# Patient Record
Sex: FEMALE | Race: BLACK OR AFRICAN AMERICAN | NOT HISPANIC OR LATINO | URBAN - METROPOLITAN AREA
[De-identification: names, ages, dates, MRNs, and addresses within clinical notes are randomized per-mention and may not be internally consistent; named-entity substitution may affect disease eponyms.]

---

## 2023-04-30 ENCOUNTER — EMERGENCY (EMERGENCY)
Facility: HOSPITAL | Age: 73
LOS: 0 days | Discharge: HOME | End: 2023-05-01
Attending: STUDENT IN AN ORGANIZED HEALTH CARE EDUCATION/TRAINING PROGRAM
Payer: MEDICARE

## 2023-04-30 VITALS
TEMPERATURE: 99 F | DIASTOLIC BLOOD PRESSURE: 89 MMHG | OXYGEN SATURATION: 95 % | WEIGHT: 190.04 LBS | HEIGHT: 63 IN | HEART RATE: 77 BPM | SYSTOLIC BLOOD PRESSURE: 191 MMHG | RESPIRATION RATE: 16 BRPM

## 2023-04-30 DIAGNOSIS — Z82.49 FAMILY HISTORY OF ISCHEMIC HEART DISEASE AND OTHER DISEASES OF THE CIRCULATORY SYSTEM: ICD-10-CM

## 2023-04-30 DIAGNOSIS — S83.92XA SPRAIN OF UNSPECIFIED SITE OF LEFT KNEE, INITIAL ENCOUNTER: ICD-10-CM
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
## 2023-04-30 DIAGNOSIS — Z88.0 ALLERGY STATUS TO PENICILLIN: ICD-10-CM

## 2023-04-30 DIAGNOSIS — M25.562 PAIN IN LEFT KNEE: ICD-10-CM

## 2023-04-30 DIAGNOSIS — Z98.891 HISTORY OF UTERINE SCAR FROM PREVIOUS SURGERY: ICD-10-CM

## 2023-04-30 DIAGNOSIS — I10 ESSENTIAL (PRIMARY) HYPERTENSION: ICD-10-CM
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
## 2023-04-30 PROCEDURE — 73562 X-RAY EXAM OF KNEE 3: CPT | Mod: 26,LT

## 2023-04-30 PROCEDURE — 99285 EMERGENCY DEPT VISIT HI MDM: CPT

## 2023-04-30 PROCEDURE — 73552 X-RAY EXAM OF FEMUR 2/>: CPT | Mod: 26,LT

## 2023-04-30 PROCEDURE — 73700 CT LOWER EXTREMITY W/O DYE: CPT | Mod: 26,LT,MA

## 2023-04-30 RX ORDER — KETOROLAC TROMETHAMINE 30 MG/ML
15 SYRINGE (ML) INJECTION ONCE
Refills: 0 | Status: DISCONTINUED | OUTPATIENT
Start: 2023-04-30 | End: 2023-04-30

## 2023-04-30 RX ORDER — LIDOCAINE 4 G/100G
1 CREAM TOPICAL ONCE
Refills: 0 | Status: COMPLETED | OUTPATIENT
Start: 2023-04-30 | End: 2023-04-30

## 2023-04-30 RX ADMIN — Medication 15 MILLIGRAM(S): at 21:07

## 2023-04-30 RX ADMIN — Medication 15 MILLIGRAM(S): at 21:38

## 2023-04-30 RX ADMIN — LIDOCAINE 1 PATCH: 4 CREAM TOPICAL at 20:50
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
## 2023-04-30 NOTE — ED ADULT NURSE REASSESSMENT NOTE - NS ED NURSE REASSESS COMMENT FT1
patient assisted onto bedpan. patient states pain has no changed but does not wish to try any other pain medications at this time. NAD noted. MD Pappas aware.
patient states pain has improved and does not require any more medication at this time. NAD noted.

## 2023-05-01 VITALS
SYSTOLIC BLOOD PRESSURE: 157 MMHG | RESPIRATION RATE: 17 BRPM | HEART RATE: 80 BPM | OXYGEN SATURATION: 98 % | DIASTOLIC BLOOD PRESSURE: 70 MMHG

## 2023-05-01 RX ORDER — OXYCODONE AND ACETAMINOPHEN 5; 325 MG/1; MG/1
1 TABLET ORAL
Qty: 10 | Refills: 0
Start: 2023-05-01 | End: 2023-05-05

## 2023-05-01 RX ORDER — OXYCODONE AND ACETAMINOPHEN 5; 325 MG/1; MG/1
1 TABLET ORAL
Qty: 7 | Refills: 0
Start: 2023-05-01 | End: 2023-05-07
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
## 2023-05-01 NOTE — ED PROVIDER NOTE - CLINICAL SUMMARY MEDICAL DECISION MAKING FREE TEXT BOX
Likely ligamentous versus tendon injury, most likely ACL.  Will rule out fracture versus dislocation with x-ray    Update "significant swelling seen on x-ray, CT ordered which showed stranding around quadricep tendon, patient was able to use extensors on exam and no clear patellar deficit, low sufficient for quadricep tendon rupture, Ortho paged and believes likely ACL versus other ligamentous injury.  Recommends knee immobilizer Ace wrap pain control weightbearing as tolerated and follow-up with Dr. Holland in 1 week

## 2023-05-01 NOTE — ED PROVIDER NOTE - CARE PROVIDER_API CALL
Naeem Holland (DO)  Orthopaedic Surgery  [address] [place] [address]  Phone: [phone]  Fax: [phone]  Established Patient  Follow Up Time: Urgent

## 2023-05-01 NOTE — ED PROVIDER NOTE - PHYSICAL EXAMINATION
General: No acute distress, mentation at baseline,  well nourished, well developed  HEENT: NCAT, Neck supple without meningismus, PERRL, no conjunctival injection  Lungs: CTAB, No wheeze or crackles, No retractions, No increased work of breathing  Heart: S1S2 RRR, No M/R/G, Pules equal Bilaterally in upper and lower extremities distally  Abd: soft, NT/ND, No guarding, No rebound.  No hernias, no palpable masses.  Extrem: L knee: Patella nontender, Medial and Lateral Joint lines both tender, Posterior drawer and Lachman’s exam without significant laxity, Varus and Valgus Stress without significant laxity, flexion limited by pain, able to extend against gravity, Neurovascular exam distally intact, Compartments surrounding are soft  Skin: No rash noted, warm dry.  Neuro:  Grossly normal.  No difficulty ambulating. No focal deficits.  Psychiatric: Appropriate mood and affect.

## 2023-05-01 NOTE — ED PROVIDER NOTE - PATIENT PORTAL LINK FT
You can access the FollowMyHealth Patient Portal offered by [facility] by registering at the following website: [email or web address]. By joining [employer]’s FollowMyHealth portal, you will also be able to view your health information using other applications (apps) compatible with our system.

## 2023-05-01 NOTE — ED PROVIDER NOTE - NS ED ROS FT
CONST: no fevers, no chills  EYES: no pain, no vision changes  ENT: no sore throat, no ear pain, no change in hearing  CV: no chest pain, no leg swelling  RESP: no shortness of breath, no cough  ABD: no abdominal pain, no nausea, no vomiting, no diarrhea  : no dysuria, no flank pain, no hematuria  MSK: no back pain, (+) extremity pain  NEURO: no headache or additional neurologic complaints  HEME: no easy bleeding  SKIN:  no rash

## 2023-05-01 NOTE — ED PROVIDER NOTE - NSICDXFAMILYHX_GEN_ALL_CORE_FT
FAMILY HISTORY:  Family history of thyroid disease    Sibling  Still living? Unknown  Family history of hypertension, Age at diagnosis: Age Unknown    Grandparent  Still living? Unknown  Family history of hypertension, Age at diagnosis: Age Unknown

## 2024-01-31 NOTE — ED ADULT NURSE NOTE - NSIMPLEMENTINTERV_GEN_ALL_ED
Home
Implemented All Fall Risk Interventions:  [place] to call system. Call bell, personal items and telephone within reach. Instruct patient to call for assistance. Room bathroom lighting operational. Non-slip footwear when patient is off stretcher. Physically safe environment: no spills, clutter or unnecessary equipment. Stretcher in lowest position, wheels locked, appropriate side rails in place. Provide visual cue, wrist band, yellow gown, etc. Monitor gait and stability. Monitor for mental status changes and reorient to person, place, and time. Review medications for side effects contributing to fall risk. Reinforce activity limits and safety measures with patient and family.

## 2025-07-05 ENCOUNTER — EMERGENCY (EMERGENCY)
Facility: HOSPITAL | Age: 75
LOS: 1 days | End: 2025-07-05
Attending: STUDENT IN AN ORGANIZED HEALTH CARE EDUCATION/TRAINING PROGRAM | Admitting: EMERGENCY MEDICINE
Payer: MEDICARE

## 2025-07-05 VITALS
TEMPERATURE: 98 F | DIASTOLIC BLOOD PRESSURE: 96 MMHG | RESPIRATION RATE: 17 BRPM | OXYGEN SATURATION: 96 % | SYSTOLIC BLOOD PRESSURE: 182 MMHG | HEART RATE: 72 BPM

## 2025-07-05 VITALS
SYSTOLIC BLOOD PRESSURE: 130 MMHG | RESPIRATION RATE: 18 BRPM | DIASTOLIC BLOOD PRESSURE: 56 MMHG | OXYGEN SATURATION: 98 % | HEART RATE: 62 BPM

## 2025-07-05 DIAGNOSIS — Z98.890 OTHER SPECIFIED POSTPROCEDURAL STATES: Chronic | ICD-10-CM

## 2025-07-05 LAB
ADD ON TEST-SPECIMEN IN LAB: SIGNIFICANT CHANGE UP
ALBUMIN SERPL ELPH-MCNC: 3.9 G/DL — SIGNIFICANT CHANGE UP (ref 3.3–5)
ALP SERPL-CCNC: 81 U/L — SIGNIFICANT CHANGE UP (ref 40–120)
ALT FLD-CCNC: 16 U/L — SIGNIFICANT CHANGE UP (ref 4–33)
ANION GAP SERPL CALC-SCNC: 12 MMOL/L — SIGNIFICANT CHANGE UP (ref 7–14)
APPEARANCE UR: CLEAR — SIGNIFICANT CHANGE UP
APTT BLD: 31.5 SEC — SIGNIFICANT CHANGE UP (ref 26.1–36.8)
AST SERPL-CCNC: 47 U/L — HIGH (ref 4–32)
BASOPHILS # BLD AUTO: 0.02 K/UL — SIGNIFICANT CHANGE UP (ref 0–0.2)
BASOPHILS NFR BLD AUTO: 0.3 % — SIGNIFICANT CHANGE UP (ref 0–2)
BILIRUB SERPL-MCNC: 0.4 MG/DL — SIGNIFICANT CHANGE UP (ref 0.2–1.2)
BILIRUB UR-MCNC: NEGATIVE — SIGNIFICANT CHANGE UP
BUN SERPL-MCNC: 21 MG/DL — SIGNIFICANT CHANGE UP (ref 7–23)
CALCIUM SERPL-MCNC: 8.6 MG/DL — SIGNIFICANT CHANGE UP (ref 8.4–10.5)
CHLORIDE SERPL-SCNC: 105 MMOL/L — SIGNIFICANT CHANGE UP (ref 98–107)
CO2 SERPL-SCNC: 21 MMOL/L — LOW (ref 22–31)
COLOR SPEC: YELLOW — SIGNIFICANT CHANGE UP
CREAT SERPL-MCNC: 1.43 MG/DL — HIGH (ref 0.5–1.3)
DIFF PNL FLD: NEGATIVE — SIGNIFICANT CHANGE UP
EGFR: 39 ML/MIN/1.73M2 — LOW
EGFR: 39 ML/MIN/1.73M2 — LOW
EOSINOPHIL # BLD AUTO: 0.32 K/UL — SIGNIFICANT CHANGE UP (ref 0–0.5)
EOSINOPHIL NFR BLD AUTO: 4.4 % — SIGNIFICANT CHANGE UP (ref 0–6)
FLUAV AG NPH QL: SIGNIFICANT CHANGE UP
FLUBV AG NPH QL: SIGNIFICANT CHANGE UP
GLUCOSE SERPL-MCNC: 77 MG/DL — SIGNIFICANT CHANGE UP (ref 70–99)
GLUCOSE UR QL: NEGATIVE MG/DL — SIGNIFICANT CHANGE UP
HCT VFR BLD CALC: 37.9 % — SIGNIFICANT CHANGE UP (ref 34.5–45)
HGB BLD-MCNC: 11.5 G/DL — SIGNIFICANT CHANGE UP (ref 11.5–15.5)
IMM GRANULOCYTES # BLD AUTO: 0.02 K/UL — SIGNIFICANT CHANGE UP (ref 0–0.07)
IMM GRANULOCYTES NFR BLD AUTO: 0.3 % — SIGNIFICANT CHANGE UP (ref 0–0.9)
INR BLD: 0.92 RATIO — SIGNIFICANT CHANGE UP (ref 0.85–1.16)
KETONES UR QL: NEGATIVE MG/DL — SIGNIFICANT CHANGE UP
LACTATE SERPL-SCNC: 0.9 MMOL/L — SIGNIFICANT CHANGE UP (ref 0.5–2)
LEUKOCYTE ESTERASE UR-ACNC: NEGATIVE — SIGNIFICANT CHANGE UP
LYMPHOCYTES # BLD AUTO: 1.27 K/UL — SIGNIFICANT CHANGE UP (ref 1–3.3)
LYMPHOCYTES NFR BLD AUTO: 17.3 % — SIGNIFICANT CHANGE UP (ref 13–44)
MCHC RBC-ENTMCNC: 26.8 PG — LOW (ref 27–34)
MCHC RBC-ENTMCNC: 30.3 G/DL — LOW (ref 32–36)
MCV RBC AUTO: 88.3 FL — SIGNIFICANT CHANGE UP (ref 80–100)
MONOCYTES # BLD AUTO: 0.68 K/UL — SIGNIFICANT CHANGE UP (ref 0–0.9)
MONOCYTES NFR BLD AUTO: 9.3 % — SIGNIFICANT CHANGE UP (ref 2–14)
NEUTROPHILS # BLD AUTO: 5.02 K/UL — SIGNIFICANT CHANGE UP (ref 1.8–7.4)
NEUTROPHILS NFR BLD AUTO: 68.4 % — SIGNIFICANT CHANGE UP (ref 43–77)
NITRITE UR-MCNC: NEGATIVE — SIGNIFICANT CHANGE UP
NRBC # BLD AUTO: 0 K/UL — SIGNIFICANT CHANGE UP (ref 0–0)
NRBC # FLD: 0 K/UL — SIGNIFICANT CHANGE UP (ref 0–0)
NRBC BLD AUTO-RTO: 0 /100 WBCS — SIGNIFICANT CHANGE UP (ref 0–0)
PH UR: 6.5 — SIGNIFICANT CHANGE UP (ref 5–8)
PLATELET # BLD AUTO: 211 K/UL — SIGNIFICANT CHANGE UP (ref 150–400)
PMV BLD: 10.8 FL — SIGNIFICANT CHANGE UP (ref 7–13)
POTASSIUM SERPL-MCNC: SIGNIFICANT CHANGE UP MMOL/L (ref 3.5–5.3)
POTASSIUM SERPL-SCNC: SIGNIFICANT CHANGE UP MMOL/L (ref 3.5–5.3)
PROT SERPL-MCNC: 7.9 G/DL — SIGNIFICANT CHANGE UP (ref 6–8.3)
PROT UR-MCNC: SIGNIFICANT CHANGE UP MG/DL
PROTHROM AB SERPL-ACNC: 11 SEC — SIGNIFICANT CHANGE UP (ref 9.9–13.4)
RBC # BLD: 4.29 M/UL — SIGNIFICANT CHANGE UP (ref 3.8–5.2)
RBC # FLD: 14.2 % — SIGNIFICANT CHANGE UP (ref 10.3–14.5)
RSV RNA NPH QL NAA+NON-PROBE: SIGNIFICANT CHANGE UP
SARS-COV-2 RNA SPEC QL NAA+PROBE: SIGNIFICANT CHANGE UP
SODIUM SERPL-SCNC: 138 MMOL/L — SIGNIFICANT CHANGE UP (ref 135–145)
SOURCE RESPIRATORY: SIGNIFICANT CHANGE UP
SP GR SPEC: 1.02 — SIGNIFICANT CHANGE UP (ref 1–1.03)
UROBILINOGEN FLD QL: 0.2 MG/DL — SIGNIFICANT CHANGE UP (ref 0.2–1)
WBC # BLD: 7.33 K/UL — SIGNIFICANT CHANGE UP (ref 3.8–10.5)
WBC # FLD AUTO: 7.33 K/UL — SIGNIFICANT CHANGE UP (ref 3.8–10.5)

## 2025-07-05 PROCEDURE — 99223 1ST HOSP IP/OBS HIGH 75: CPT

## 2025-07-05 PROCEDURE — 93971 EXTREMITY STUDY: CPT | Mod: 26,LT

## 2025-07-05 PROCEDURE — 73590 X-RAY EXAM OF LOWER LEG: CPT | Mod: 26,LT

## 2025-07-05 PROCEDURE — 99284 EMERGENCY DEPT VISIT MOD MDM: CPT | Mod: GC

## 2025-07-05 RX ORDER — CLINDAMYCIN PHOSPHATE 150 MG/ML
VIAL (ML) INJECTION
Refills: 0 | Status: DISCONTINUED | OUTPATIENT
Start: 2025-07-05 | End: 2025-07-05

## 2025-07-05 RX ORDER — CLINDAMYCIN PHOSPHATE 150 MG/ML
900 VIAL (ML) INJECTION ONCE
Refills: 0 | Status: COMPLETED | OUTPATIENT
Start: 2025-07-05 | End: 2025-07-05

## 2025-07-05 RX ORDER — CLOTRIMAZOLE AND BETAMETHASONE DIPROPIONATE 10; .64 MG/G; MG/G
1 CREAM TOPICAL
Refills: 0 | Status: DISCONTINUED | OUTPATIENT
Start: 2025-07-05 | End: 2025-07-05

## 2025-07-05 RX ORDER — CARVEDILOL 3.12 MG/1
6.25 TABLET, FILM COATED ORAL EVERY 12 HOURS
Refills: 0 | Status: DISCONTINUED | OUTPATIENT
Start: 2025-07-05 | End: 2025-07-08

## 2025-07-05 RX ORDER — KETOROLAC TROMETHAMINE 30 MG/ML
15 INJECTION, SOLUTION INTRAMUSCULAR; INTRAVENOUS EVERY 6 HOURS
Refills: 0 | Status: DISCONTINUED | OUTPATIENT
Start: 2025-07-05 | End: 2025-07-05

## 2025-07-05 RX ORDER — CLINDAMYCIN PHOSPHATE 150 MG/ML
600 VIAL (ML) INJECTION ONCE
Refills: 0 | Status: DISCONTINUED | OUTPATIENT
Start: 2025-07-05 | End: 2025-07-05

## 2025-07-05 RX ORDER — AMLODIPINE BESYLATE 10 MG/1
2.5 TABLET ORAL DAILY
Refills: 0 | Status: DISCONTINUED | OUTPATIENT
Start: 2025-07-05 | End: 2025-07-08

## 2025-07-05 RX ORDER — MUPIROCIN CALCIUM 20 MG/G
1 CREAM TOPICAL
Refills: 0 | Status: DISCONTINUED | OUTPATIENT
Start: 2025-07-05 | End: 2025-07-05

## 2025-07-05 RX ORDER — CLINDAMYCIN PHOSPHATE 150 MG/ML
900 VIAL (ML) INJECTION EVERY 8 HOURS
Refills: 0 | Status: DISCONTINUED | OUTPATIENT
Start: 2025-07-05 | End: 2025-07-05

## 2025-07-05 RX ADMIN — CARVEDILOL 6.25 MILLIGRAM(S): 3.12 TABLET, FILM COATED ORAL at 16:20

## 2025-07-05 RX ADMIN — AMLODIPINE BESYLATE 2.5 MILLIGRAM(S): 10 TABLET ORAL at 16:20

## 2025-07-05 RX ADMIN — Medication 100 MILLIGRAM(S): at 15:19

## 2025-07-05 RX ADMIN — Medication 1 APPLICATION(S): at 17:48

## 2025-07-05 RX ADMIN — Medication 10 MILLIGRAM(S): at 16:20

## 2025-07-05 NOTE — ED PROVIDER NOTE - NSICDXPASTSURGICALHX_GEN_ALL_CORE_FT
PAST SURGICAL HISTORY:  H/O vascular surgery left leg 1980s as per pt    S/P  section x3    S/P hernia repair

## 2025-07-05 NOTE — CONSULT NOTE ADULT - ASSESSMENT
#Eczematous dermatitis, LLE, torso and extremities  - Discussed natural course with patient  - No evidence of cellulitis on exam today, can discontinue oral antibiotics   - START clobetasol ointment to affected areas BID for up to 2 weeks on 1 week, avoid face    Patient can follow up with us in the [facility] located at [address] [place] [address]. [address], [place] [address] upon discharge. Our office will call to schedule an appointment but if patient does not hear from us within a few days of discharge, please instruct patient to call our office. Office phone number is [phone].    Patient was seen at bedside and staffed in person with the dermatology attending Dr. iLu  Recommendations were communicated with the primary team.  Please page [phone] for further related questions.    Geovany Yoder MD  Resident Physician, PGY2  [facility]  Pager: [phone]  Office: [phone]

## 2025-07-05 NOTE — ED PROVIDER NOTE - OBJECTIVE STATEMENT
75 y/o F PMHx HTN presents with persistent cellulitis for 2 months. Patient says she hit her left leg on May 1st and developed a bruise on her leg after which has worsened since then. She says she has completed a 7 day course of Bactrim last week which did not resolve symptoms. Pt says her PCP prescribed another 7 day course of Bactrim which she is on day 3 of currently. Patient says she has also been experiencing bruises on her body. Denies fever, chills, HA, vision changes, n/v/d 75 y/o F PMHx HTN presents with persistent cellulitis for 2 months. Patient says she hit her left leg on May 1st and developed a bruise on her leg after which has worsened since then. She says she has completed a 7 day course of Bactrim last week which did not resolve symptoms. Pt says her PCP prescribed another 7 day course of Bactrim which she is on day 3 of currently. Patient says she has also been experiencing itchy rashes over her body since starting the Bactrim. Denies fever, chills, HA, vision changes, n/v/d

## 2025-07-05 NOTE — ED CDU PROVIDER DISPOSITION NOTE - CLINICAL COURSE
75 y/o F PMHx HTN presents with persistent LE redness, pain and swelling for 2 months. Patient says she hit her left leg on May 1st and developed a bruise on her leg after which has worsened since then. She says she has completed a 7 day course of Bactrim last week which did not resolve symptoms. Pt says her PCP prescribed another 7 day course of Bactrim which she is on day 3 of currently. Patient says she has also been experiencing itchy rashes over her body since starting the Bactrim. Pt endorses a remote h/o sarcoidosis and a remote h/o a "vein surgery" on her LLE in the 1980s. Pt denies fever chills or nightsweats, CP, SOB, denies any other sx or acute complaints. The area of concern was demarcated, of note pt states she has been putting dressings to the area with tape and states the redness has primarily been around the tape. Dermatology was consulted by the ED team. Considerations include possible LE cellulitis, possible contact dermatitis possible venous insufficiency. A LE duplex was ordered to eval for a DVT. Will provide IV clindamycin in the interim, reassess. DVT study was neg, pt was seen by derm, feels the etiology is a contact dermatitis and nummular eczema. State abx no longer needed, writer agrees, systemically well, no lactate or leukocytosis. Will plan to d/c on clobetasol as advised with outpatient derm f/u. Addressed pt's elavated BP with her, home meds given, pt states her bp is often high in healthcare settings, denies HA visual change or other sx, will d/c with dermatology follow up and return precautions.

## 2025-07-05 NOTE — ED ADULT TRIAGE NOTE - CHIEF COMPLAINT QUOTE
Pt c/o persistent cellulitis to left lower leg, Pt states she finished 2 courses of oral antibiotics with no improvement. Past hx htn, Pt hypertensive in triage, asymptomatic

## 2025-07-05 NOTE — ED PROVIDER NOTE - PROGRESS NOTE DETAILS
Isabella PGY1: Spoke to CDU to admit patient for IV antibiotic treatment with Vancomycin. CDU recommended admission for Vanc Isabella PGY1: Spoke to CDU will admit patient for IV Clindamycin treatment and will consult Derm for rash

## 2025-07-05 NOTE — ED CDU PROVIDER INITIAL DAY NOTE - PROGRESS NOTE DETAILS
Pt was evaluated by Dermatology while in CDU including by attending Dr. Guzman, do not feel the pt required antibitoics, suspicious for a contact dermatitis and nummular eczema, will d/c with outpatient follow up, return precautions. Dermatology recommends clobetasol cream.

## 2025-07-05 NOTE — ED PROVIDER NOTE - CARE PLAN
Assessment and plan of treatment:	Cellulitis failed 2 courses antibiotics  Sepsis workup (labs, xray)   1 Principal Discharge DX:	Rash  Assessment and plan of treatment:	Cellulitis failed 2 courses antibiotics  Sepsis workup (labs, xray)

## 2025-07-05 NOTE — ED CDU PROVIDER INITIAL DAY NOTE - CLINICAL SUMMARY MEDICAL DECISION MAKING FREE TEXT BOX
73 y/o F PMHx HTN presents with persistent LE redness, pain and swelling for 2 months. Patient says she hit her left leg on May 1st and developed a bruise on her leg after which has worsened since then. She says she has completed a 7 day course of Bactrim last week which did not resolve symptoms. Pt says her PCP prescribed another 7 day course of Bactrim which she is on day 3 of currently. Patient says she has also been experiencing itchy rashes over her body since starting the Bactrim. Pt endorses a remote h/o sarcoidosis and a remote h/o a "vein surgery" on her LLE in the 1980s. Pt denies fever chills or nightsweats, CP, SOB, denies any other sx or acute complaints. The area of concern was demarcated, of note pt states she has been putting dressings to the area with tape and states the redness has primarily been around the tape. Dermatology was consulted by the ED team. Considerations include possible LE cellulitis, possible contact dermatitis possible venous insufficiency. A LE duplex was ordered to eval for a DVT. Will provide IV clindamycin in the interim, reassess.

## 2025-07-05 NOTE — ED ADULT NURSE NOTE - TEMPLATE LIST FOR HEAD TO TOE ASSESSMENT
General
Initiate Treatment: Apply ketoconazole 2% shampoo to scalp
Detail Level: Zone
Render In Strict Bullet Format?: No

## 2025-07-05 NOTE — ED CDU PROVIDER DISPOSITION NOTE - ATTENDING APP SHARED VISIT CONTRIBUTION OF CARE
kanchan: pt placed in cdu for derm eval.  derm saw pt, came up with plan, pt agrees and understands, lucid and alert.    I performed a history and physical exam of the patient and discussed their management with the resident and /or advanced care provider. I personally made/approved the management plan and take responsibility for the patient management. I reviewed the resident and /or ACP's note and agree with the documented findings and plan of care. My medical decison making and observations are found above. kanchan: pt placed in cdu for derm eval.  derm saw pt, came up with plan, pt agrees and understands, lucid and alert.    I performed a history and physical exam of the patient and discussed their management with the resident and /or advanced care provider. I personally made/approved the management plan and take responsibility for the patient management. I reviewed the resident and /or ACP's note and agree with the documented findings and plan of care. My medical decision making and observations are found above.. kanchan: pt placed in cdu for derm eval.  derm saw pt, came up with plan, pt agrees and understands, pt with rash. lucid and alert and ambulatory.      I performed a history and physical exam of the patient and discussed their management with the resident and /or advanced care provider. I personally made/approved the management plan and take responsibility for the patient management. I reviewed the resident and /or ACP's note and agree with the documented findings and plan of care. My medical decision making and observations are found above..

## 2025-07-05 NOTE — ED CDU PROVIDER INITIAL DAY NOTE - OBJECTIVE STATEMENT
75 y/o F PMHx HTN presents with persistent LE redness, pain and swelling for 2 months. Patient says she hit her left leg on May 1st and developed a bruise on her leg after which has worsened since then. She says she has completed a 7 day course of Bactrim last week which did not resolve symptoms. Pt says her PCP prescribed another 7 day course of Bactrim which she is on day 3 of currently. Patient says she has also been experiencing itchy rashes over her body since starting the Bactrim. Pt endorses a remote h/o sarcoidosis and a remote h/o a "vein surgery" on her LLE in the 1980s. Pt denies fever chills or nightsweats, CP, SOB, denies any other sx or acute complaints. The area of concern was demarcated, of note pt states she has been putting dressings to the area with tape and states the redness has primarily been around the tape.

## 2025-07-05 NOTE — ED PROVIDER NOTE - CLINICAL SUMMARY MEDICAL DECISION MAKING FREE TEXT BOX
Isabella PGY1: 75 y/o F PMHx HTN, HLD presents with worsening cellulitis to left anterior shin for 2 months. Patient has completed 7 day course of Bactrim in May and is currently on second day of 7 day course of Bactrim with worsening of cellulitis and diffuse scaly rash that began after beginning Bactrim. Physical exam notable for tender, scaly, erythematous rash to left anterior shin and diffuse 1 inch scaly pruritic rashes.   Pending labs and imaging, considering admission for IV antibiotic treatment.

## 2025-07-05 NOTE — ED CDU PROVIDER INITIAL DAY NOTE - ATTENDING APP SHARED VISIT CONTRIBUTION OF CARE
74yoF PMHx HTN presenting with worsening swelling and redness to her left shin. She wounded her shin 2 weeks ago, banging it into a stool, finished a 7 day course of bactrim and is now on day 3 of second course of bactrim, returning due to increasing area of redness, swelling around the wound. She also noticed spots of itchy dark rash appearing all over her arms and legs for the past week. No involvement of mucosal membranes including no vision changes, no dark or bloody stool, no swelling or pain in her gums or mouth. No mucosal lesions, or lesions to palms or soles noted on exam. Pt admitted to CDU for derm consult and IV abx for cellulitis.

## 2025-07-05 NOTE — ED CDU PROVIDER DISPOSITION NOTE - NSFOLLOWUPINSTRUCTIONS_ED_ALL_ED_FT
Take clobetasol as prescribed. Antibiotics are not currently needed as the cause is nummular eczema and a contact dermatitis. Do not apply clobetasol to the face. Follow up with a Dermatologist, the ER will call you to assist with follow up or you can call  located at [address] [facility] [address]. Your blood pressure was elevated in the ER, discuss this with your primary doctor. Advance activity as tolerated.  Continue all previously prescribed medications as directed.  Follow up with your primary care physician in 48-72 hours- bring copies of your results.  Return to the ER for worsening or persistent symptoms, and/or ANY NEW OR CONCERNING SYMPTOMS. THIS INCLUDES BUT IS NOT LIMITED TO FEVER, CHILLS, NIGHTSWEATS, INCREASING REDNESS OR FOR ANY OTHER SYMPTOMS THAT CONCERN YOU. If you have issues obtaining follow up, please call: [phone] to obtain a doctor or specialist who takes your insurance in your area.  You may call [phone] to make an appointment with the internal medicine clinic.

## 2025-07-05 NOTE — CONSULT NOTE ADULT - ATTENDING COMMENTS
Rash clinically consistent with an eczematous dermatitis. The lesion on the leg may have a component of LSC. Chronic venous stasis dermatitis and ACD are both considerations. Recommend initiation of clobetasol. Low suspicion for infection based on the clinical exam.     Joel cAosta MD, PharmD, MPH  Co-Director, Inpatient Dermatology Consultation Service, [facility]
Quality 265: Biopsy Follow-Up: Biopsy results reviewed, communicated, tracked, and documented
Detail Level: Detailed

## 2025-07-05 NOTE — ED PROVIDER NOTE - ATTENDING CONTRIBUTION TO CARE
I personally evaluated the patient. I reviewed the Resident’s or Physician Assistant’s note (as assigned above), and agree with the findings and plan except as documented in my note.    74yoF PMHx HTN presenting with worsening swelling and redness to her left shin. She wounded her shin 2 weeks ago, banging it into a stool, finished a 7 day course of bactrim and is now on day 3 of second course of bactrim, returning due to increasing area of redness, swelling around the wound. She also noticed spots of itchy dark rash appearing all over her arms and legs for the past week. No involvement of mucosal membranes including no vision changes, no dark or bloody stool, no swelling or pain in her gums or mouth. No mucosal lesions, or lesions to palms or soles noted on exam. Pt admitted to CDU for derm consult and IV abx for cellulitis.

## 2025-07-05 NOTE — ED CDU PROVIDER DISPOSITION NOTE - PATIENT PORTAL LINK FT
You can access the FollowMyHealth Patient Portal offered by [facility] by registering at the following website: [email or web address]. By joining [employer]’s FollowMyHealth portal, you will also be able to view your health information using other applications (apps) compatible with our system.

## 2025-07-05 NOTE — ED PROVIDER NOTE - PHYSICAL EXAMINATION
Gen: AAOx3, non-toxic  HEENT: NCAT. PEERLA, EOMI, oral mucosa moist, normal conjunctiva  Lung: CTAB, no respiratory distress, no wheezes/rhonchi/rales B/L, speaking in full sentences  CV: RRR, no murmurs, rubs or gallops  Abd: soft, NTND, no guarding, no CVA tenderness  MSK: no visible deformities  Neuro: No focal sensory or motor deficits  Skin: Warm, scaly, erythematous poorly demarcated area to left anterior shin, warm, well perfused  Psych: normal affect. Gen: AAOx3, non-toxic  HEENT: NCAT. PEERLA, EOMI, oral mucosa moist, normal conjunctiva  Lung: CTAB, no respiratory distress, no wheezes/rhonchi/rales B/L, speaking in full sentences  CV: RRR, no murmurs, rubs or gallops  Abd: soft, NTND, no guarding, no CVA tenderness  MSK: no visible deformities  Neuro: No focal sensory or motor deficits  Skin: Warm, scaly, erythematous poorly demarcated area to left anterior shin, warm, well perfused. Multiple scaly pruritic rashes noted on back, chest, arm  Psych: normal affect.

## 2025-07-05 NOTE — ED ADULT NURSE NOTE - OBJECTIVE STATEMENT
A&Ox4, ambulatory with steady gait, history of hypertension, presents to ED complaining of persistent cellulitis to L lower leg. Pt states she finished 2 courses of oral antibiotics without seeing improvement. Respirations are even and unlabored, sating 100% on room air. Awaiting orders.

## 2025-07-05 NOTE — ED CDU PROVIDER INITIAL DAY NOTE - PHYSICAL EXAMINATION
Gen: AAOx3, non-toxic  HEENT: NCAT. PEERLA, EOMI, oral mucosa moist, normal conjunctiva  Lung: CTAB, no respiratory distress, no wheezes/rhonchi/rales B/L, speaking in full sentences  CV: RRR, no murmurs, rubs or gallops  Abd: soft, NTND, no guarding, no CVA tenderness  MSK: no visible deformities  Neuro: No focal sensory or motor deficits  Skin: Warm, scaly, erythematous poorly demarcated area to left anterior shin, warm, cap refill <2 seconds, palpable dorsal pulse. Area of erythema is [id number]. Multiple scaly pruritic rashes noted on back, chest, arm  Psych: normal affect.

## 2025-07-05 NOTE — CONSULT NOTE ADULT - SUBJECTIVE AND OBJECTIVE BOX
HPI: Pt is 74 year old presenting for worsening of LLE rash. Notes that she was diagnosed with cellulitis 2 months ago which has persisted. Has taken Bactrim x 2 times. Notes rash is very itchy, now getting spots on other parts of her body. Initially started as trauma to area. Notes that when she but bandaids and other topicals in the area the rash worsened.       PAST MEDICAL & SURGICAL HISTORY:  HTN (hypertension)      Sarcoidosis    S/P  section  x3      S/P hernia repair      H/O vascular surgery  left leg 1980s as per pt        Review of Systems:  Constitutional: denies fevers, chills  HEENT: denies odynophagia or dysphagia  Cardiovascular: denies palpitations  Respiratory: denies SOB, wheezing  Gastrointestinal: denies N/V/D, abdominal pain  : denies dysuria  MSK: denies weakness, joint pain  Skin: denies new rashes or masses unless otherwise specified in hpi    MEDICATIONS  (STANDING):  amLODIPine   Tablet 2.5 milliGRAM(s) Oral daily  carvedilol 6.25 milliGRAM(s) Oral every 12 hours  cetirizine 10 milliGRAM(s) Oral daily    MEDICATIONS  (PRN):  ketorolac   Injectable 15 milliGRAM(s) IV Push every 6 hours PRN Moderate Pain (4 - 6)      Allergies    penicillin (Rash)    Intolerances        SOCIAL HISTORY: Here with     FAMILY HISTORY:  Family history of hypertension (Sibling, Grandparent)    Family history of thyroid disease        Vital Signs Last 24 Hrs  T(C): 36.4 (2025 16:20), Max: 36.7 (2025 11:13)  T(F): 97.6 (2025 16:20), Max: 98 (2025 11:13)  HR: 62 (2025 17:24) (62 - 72)  BP: 130/56 (2025 17:24) (130/56 - 185/75)  BP(mean): --  RR: 18 (2025 17:24) (17 - 24)  SpO2: 98% (2025 17:24) (96% - 100%)    Parameters below as of 2025 17:24  Patient On (Oxygen Delivery Method): room air      PHYSICAL EXAM:   The patient was alert and oriented X 3 and in no apparent distress. Oropharynx showed no ulcerations. There was no visible lymphadenopathy. Conjunctiva were non-injected. There was no clubbing or edema of extremities.     The scalp, hair, face, eyebrows, lips, oropharynx , neck, chest, back, buttocks, extremities X 4, hands, feet, nails were examined. There was no hyperhidrosis or bromhidrosis.     The following lesions are noted: Lichenified plaque with surrounding erythematous thin plaque on LLE, eczematous patches scattered on torso and extremities.     LABS:                        11.5   7.33  )-----------( 211      ( 2025 12:15 )             37.9     07-    138  |  105  |  21  ----------------------------<  77  TNP   |  21[L]  |  1.43[H]    Ca    8.6      2025 12:15    TPro  7.9  /  Alb  3.9  /  TBili  0.4  /  DBili  x   /  AST  47[H]  /  ALT  16  /  AlkPhos  81  07-05    PT/INR - ( 2025 12:54 )   PT: 11.0 sec;   INR: 0.92 ratio         PTT - ( 2025 12:54 )  PTT:31.5 sec  Urinalysis Basic - ( 2025 16:34 )    Color: Yellow / Appearance: Clear / S.022 / pH: x  Gluc: x / Ketone: x  / Bili: Negative / Urobili: 0.2 mg/dL   Blood: x / Protein: Trace mg/dL / Nitrite: Negative   Leuk Esterase: Negative / RBC: x / WBC x   Sq Epi: x / Non Sq Epi: x / Bacteria: x        RADIOLOGY & ADDITIONAL STUDIES:   HPI: Pt is 74 year old presenting for worsening of LLE rash. Notes that she was diagnosed with cellulitis 2 months ago which has persisted. Has taken Bactrim x 2 times. Notes rash is very itchy, now getting spots on other parts of her body. Initially started as trauma to area. Notes that when she but bandaids and other topicals in the area the rash worsened.       PAST MEDICAL & SURGICAL HISTORY:  HTN (hypertension)      Sarcoidosis    S/P  section  x3      S/P hernia repair      H/O vascular surgery  left leg 1980s as per pt        Review of Systems:  Constitutional: denies fevers, chills  HEENT: denies odynophagia or dysphagia  Cardiovascular: denies palpitations  Respiratory: denies SOB, wheezing  Gastrointestinal: denies N/V/D, abdominal pain  : denies dysuria  MSK: denies weakness, joint pain  Skin: denies new rashes or masses unless otherwise specified in hpi    MEDICATIONS  (STANDING):  amLODIPine   Tablet 2.5 milliGRAM(s) Oral daily  carvedilol 6.25 milliGRAM(s) Oral every 12 hours  cetirizine 10 milliGRAM(s) Oral daily    MEDICATIONS  (PRN):  ketorolac   Injectable 15 milliGRAM(s) IV Push every 6 hours PRN Moderate Pain (4 - 6)      Allergies    penicillin (Rash)    Intolerances        SOCIAL HISTORY: Here with     FAMILY HISTORY:  Family history of hypertension (Sibling, Grandparent)    Family history of thyroid disease        Vital Signs Last 24 Hrs  T(C): 36.4 (2025 16:20), Max: 36.7 (2025 11:13)  T(F): 97.6 (2025 16:20), Max: 98 (2025 11:13)  HR: 62 (2025 17:24) (62 - 72)  BP: 130/56 (2025 17:24) (130/56 - 185/75)  BP(mean): --  RR: 18 (2025 17:24) (17 - 24)  SpO2: 98% (2025 17:24) (96% - 100%)    Parameters below as of 2025 17:24  Patient On (Oxygen Delivery Method): room air      PHYSICAL EXAM:   The patient was alert and oriented X 3 and in no apparent distress. Oropharynx showed no ulcerations. There was no visible lymphadenopathy. Conjunctiva were non-injected. There was no clubbing or edema of extremities.     The scalp, hair, face, eyebrows, lips, oropharynx , neck, chest, back, buttocks, extremities X 4, hands, feet, nails were examined. There was no hyperhidrosis or bromhidrosis.     The following lesions are noted: Lichenified plaque with surrounding erythematous thin plaque on LLE, eczematous patches scattered on torso and extremities.     LABS:                        11.5   7.33  )-----------( 211      ( 2025 12:15 )             37.9     07-    138  |  105  |  21  ----------------------------<  77  TNP   |  21[L]  |  1.43[H]    Ca    8.6      2025 12:15    TPro  7.9  /  Alb  3.9  /  TBili  0.4  /  DBili  x   /  AST  47[H]  /  ALT  16  /  AlkPhos  81  07-05    PT/INR - ( 2025 12:54 )   PT: 11.0 sec;   INR: 0.92 ratio         PTT - ( 2025 12:54 )  PTT:31.5 sec  Urinalysis Basic - ( 2025 16:34 )    Color: Yellow / Appearance: Clear / S.022 / pH: x  Gluc: x / Ketone: x  / Bili: Negative / Urobili: 0.2 mg/dL   Blood: x / Protein: Trace mg/dL / Nitrite: Negative   Leuk Esterase: Negative / RBC: x / WBC x   Sq Epi: x / Non Sq Epi: x / Bacteria: x        RADIOLOGY & ADDITIONAL STUDIES: no additional relevant studies

## 2025-07-07 ENCOUNTER — NON-APPOINTMENT (OUTPATIENT)
Age: 75
End: 2025-07-07

## 2025-07-10 PROBLEM — D86.9 SARCOIDOSIS, UNSPECIFIED: Chronic | Status: ACTIVE | Noted: 2025-07-05

## 2025-07-10 LAB
CULTURE RESULTS: SIGNIFICANT CHANGE UP
CULTURE RESULTS: SIGNIFICANT CHANGE UP
SPECIMEN SOURCE: SIGNIFICANT CHANGE UP
SPECIMEN SOURCE: SIGNIFICANT CHANGE UP

## 2025-07-31 ENCOUNTER — APPOINTMENT (OUTPATIENT)
Dept: DERMATOLOGY | Facility: CLINIC | Age: 75
End: 2025-07-31
Payer: MEDICARE

## 2025-07-31 DIAGNOSIS — L30.9 DERMATITIS, UNSPECIFIED: ICD-10-CM

## 2025-07-31 DIAGNOSIS — L85.3 XEROSIS CUTIS: ICD-10-CM

## 2025-07-31 PROCEDURE — 99214 OFFICE O/P EST MOD 30 MIN: CPT

## 2025-07-31 RX ORDER — CLOBETASOL PROPIONATE 0.5 MG/G
0.05 OINTMENT TOPICAL
Qty: 1 | Refills: 3 | Status: ACTIVE | COMMUNITY
Start: 2025-07-31 | End: 1900-01-01

## 2025-08-05 RX ORDER — TACROLIMUS 1 MG/G
0.1 OINTMENT TOPICAL TWICE DAILY
Qty: 1 | Refills: 0 | Status: ACTIVE | COMMUNITY
Start: 2025-07-31